# Patient Record
Sex: FEMALE | Race: WHITE | NOT HISPANIC OR LATINO | URBAN - METROPOLITAN AREA
[De-identification: names, ages, dates, MRNs, and addresses within clinical notes are randomized per-mention and may not be internally consistent; named-entity substitution may affect disease eponyms.]

---

## 2017-05-05 ENCOUNTER — OUTPATIENT (OUTPATIENT)
Dept: OUTPATIENT SERVICES | Facility: HOSPITAL | Age: 62
LOS: 1 days | End: 2017-05-05
Payer: COMMERCIAL

## 2017-05-05 VITALS
SYSTOLIC BLOOD PRESSURE: 148 MMHG | DIASTOLIC BLOOD PRESSURE: 84 MMHG | HEIGHT: 63 IN | WEIGHT: 154.98 LBS | TEMPERATURE: 99 F | RESPIRATION RATE: 18 BRPM | OXYGEN SATURATION: 98 % | HEART RATE: 78 BPM

## 2017-05-05 DIAGNOSIS — Z01.818 ENCOUNTER FOR OTHER PREPROCEDURAL EXAMINATION: ICD-10-CM

## 2017-05-05 DIAGNOSIS — M75.22 BICIPITAL TENDINITIS, LEFT SHOULDER: ICD-10-CM

## 2017-05-05 DIAGNOSIS — Z98.890 OTHER SPECIFIED POSTPROCEDURAL STATES: Chronic | ICD-10-CM

## 2017-05-05 PROCEDURE — G0463: CPT

## 2017-05-05 NOTE — H&P PST ADULT - PROBLEM SELECTOR PLAN 1
Scheduled for left shoulder arthroscopy, rotator cuff repair, possible subscapular repair, subacromial decompression, possible biceps tenodesis and poasis tenotomy on 5/16/17

## 2017-05-05 NOTE — H&P PST ADULT - FAMILY HISTORY
Mother  Still living? No  Family history of stroke, Age at diagnosis: Age Unknown     Father  Still living? No  Family history of lung cancer, Age at diagnosis: Age Unknown

## 2017-05-05 NOTE — H&P PST ADULT - NSANTHOSAYNRD_GEN_A_CORE
No. CHANTALE screening performed.  STOP BANG Legend: 0-2 = LOW Risk; 3-4 = INTERMEDIATE Risk; 5-8 = HIGH Risk

## 2017-05-05 NOTE — H&P PST ADULT - HISTORY OF PRESENT ILLNESS
61year old female with pmhx of hypertension, umbilical hernia and osteoarthritis presents today with left shoulder pain x years. Patient is here today for presurgical testing for scheduled left shoulder arthroscopy, rotator cuff repair, possible subscapular repair, subacromial decompression, possible biceps tenodesis and poasis tenotomy on 5/16/17

## 2017-05-15 RX ORDER — ACETAMINOPHEN 500 MG
975 TABLET ORAL ONCE
Qty: 0 | Refills: 0 | Status: COMPLETED | OUTPATIENT
Start: 2017-05-16 | End: 2017-05-16

## 2017-05-15 RX ORDER — CELECOXIB 200 MG/1
200 CAPSULE ORAL ONCE
Qty: 0 | Refills: 0 | Status: COMPLETED | OUTPATIENT
Start: 2017-05-16 | End: 2017-05-16

## 2017-05-16 ENCOUNTER — OUTPATIENT (OUTPATIENT)
Dept: OUTPATIENT SERVICES | Facility: HOSPITAL | Age: 62
LOS: 1 days | Discharge: HOME | End: 2017-05-16
Payer: COMMERCIAL

## 2017-05-16 VITALS
WEIGHT: 154.98 LBS | TEMPERATURE: 98 F | RESPIRATION RATE: 14 BRPM | HEART RATE: 77 BPM | OXYGEN SATURATION: 98 % | DIASTOLIC BLOOD PRESSURE: 83 MMHG | SYSTOLIC BLOOD PRESSURE: 138 MMHG | HEIGHT: 63 IN

## 2017-05-16 VITALS
HEART RATE: 76 BPM | DIASTOLIC BLOOD PRESSURE: 74 MMHG | OXYGEN SATURATION: 98 % | SYSTOLIC BLOOD PRESSURE: 130 MMHG | TEMPERATURE: 98 F | RESPIRATION RATE: 12 BRPM

## 2017-05-16 DIAGNOSIS — M75.22 BICIPITAL TENDINITIS, LEFT SHOULDER: ICD-10-CM

## 2017-05-16 DIAGNOSIS — Z98.890 OTHER SPECIFIED POSTPROCEDURAL STATES: Chronic | ICD-10-CM

## 2017-05-16 PROCEDURE — C1889: CPT

## 2017-05-16 PROCEDURE — 23430 REPAIR BICEPS TENDON: CPT | Mod: LT

## 2017-05-16 PROCEDURE — 73030 X-RAY EXAM OF SHOULDER: CPT | Mod: 26,50

## 2017-05-16 PROCEDURE — 29827 SHO ARTHRS SRG RT8TR CUF RPR: CPT | Mod: LT

## 2017-05-16 PROCEDURE — 73030 X-RAY EXAM OF SHOULDER: CPT

## 2017-05-16 PROCEDURE — C1713: CPT

## 2017-05-16 RX ORDER — SODIUM CHLORIDE 9 MG/ML
1000 INJECTION, SOLUTION INTRAVENOUS
Qty: 0 | Refills: 0 | Status: DISCONTINUED | OUTPATIENT
Start: 2017-05-16 | End: 2017-05-16

## 2017-05-16 RX ORDER — DICLOFENAC SODIUM 75 MG/1
1 TABLET, DELAYED RELEASE ORAL
Qty: 0 | Refills: 0 | COMMUNITY

## 2017-05-16 RX ORDER — ACETAMINOPHEN 500 MG
1000 TABLET ORAL ONCE
Qty: 0 | Refills: 0 | Status: COMPLETED | OUTPATIENT
Start: 2017-05-16 | End: 2017-05-16

## 2017-05-16 RX ORDER — HYDROMORPHONE HYDROCHLORIDE 2 MG/ML
0.5 INJECTION INTRAMUSCULAR; INTRAVENOUS; SUBCUTANEOUS
Qty: 0 | Refills: 0 | Status: DISCONTINUED | OUTPATIENT
Start: 2017-05-16 | End: 2017-05-16

## 2017-05-16 RX ORDER — ONDANSETRON 8 MG/1
4 TABLET, FILM COATED ORAL ONCE
Qty: 0 | Refills: 0 | Status: DISCONTINUED | OUTPATIENT
Start: 2017-05-16 | End: 2017-05-16

## 2017-05-16 RX ORDER — SODIUM CHLORIDE 9 MG/ML
3 INJECTION INTRAMUSCULAR; INTRAVENOUS; SUBCUTANEOUS EVERY 8 HOURS
Qty: 0 | Refills: 0 | Status: DISCONTINUED | OUTPATIENT
Start: 2017-05-16 | End: 2017-05-16

## 2017-05-16 RX ORDER — ONDANSETRON 8 MG/1
4 TABLET, FILM COATED ORAL EVERY 6 HOURS
Qty: 0 | Refills: 0 | Status: DISCONTINUED | OUTPATIENT
Start: 2017-05-16 | End: 2017-05-24

## 2017-05-16 RX ORDER — OXYCODONE HYDROCHLORIDE 5 MG/1
1 TABLET ORAL
Qty: 0 | Refills: 0 | COMMUNITY
Start: 2017-05-16

## 2017-05-16 RX ORDER — SODIUM CHLORIDE 9 MG/ML
1000 INJECTION, SOLUTION INTRAVENOUS
Qty: 0 | Refills: 0 | Status: DISCONTINUED | OUTPATIENT
Start: 2017-05-16 | End: 2017-05-24

## 2017-05-16 RX ADMIN — HYDROMORPHONE HYDROCHLORIDE 0.5 MILLIGRAM(S): 2 INJECTION INTRAMUSCULAR; INTRAVENOUS; SUBCUTANEOUS at 13:27

## 2017-05-16 RX ADMIN — HYDROMORPHONE HYDROCHLORIDE 0.5 MILLIGRAM(S): 2 INJECTION INTRAMUSCULAR; INTRAVENOUS; SUBCUTANEOUS at 13:12

## 2017-05-16 RX ADMIN — Medication 975 MILLIGRAM(S): at 07:25

## 2017-05-16 RX ADMIN — HYDROMORPHONE HYDROCHLORIDE 0.5 MILLIGRAM(S): 2 INJECTION INTRAMUSCULAR; INTRAVENOUS; SUBCUTANEOUS at 15:16

## 2017-05-16 RX ADMIN — HYDROMORPHONE HYDROCHLORIDE 0.5 MILLIGRAM(S): 2 INJECTION INTRAMUSCULAR; INTRAVENOUS; SUBCUTANEOUS at 14:12

## 2017-05-16 RX ADMIN — Medication 1000 MILLIGRAM(S): at 14:27

## 2017-05-16 RX ADMIN — SODIUM CHLORIDE 3 MILLILITER(S): 9 INJECTION INTRAMUSCULAR; INTRAVENOUS; SUBCUTANEOUS at 07:32

## 2017-05-16 RX ADMIN — SODIUM CHLORIDE 125 MILLILITER(S): 9 INJECTION, SOLUTION INTRAVENOUS at 14:00

## 2017-05-16 RX ADMIN — HYDROMORPHONE HYDROCHLORIDE 0.5 MILLIGRAM(S): 2 INJECTION INTRAMUSCULAR; INTRAVENOUS; SUBCUTANEOUS at 14:29

## 2017-05-16 RX ADMIN — Medication 400 MILLIGRAM(S): at 13:57

## 2017-05-16 RX ADMIN — CELECOXIB 200 MILLIGRAM(S): 200 CAPSULE ORAL at 07:25

## 2017-05-16 RX ADMIN — HYDROMORPHONE HYDROCHLORIDE 0.5 MILLIGRAM(S): 2 INJECTION INTRAMUSCULAR; INTRAVENOUS; SUBCUTANEOUS at 13:01

## 2017-05-16 RX ADMIN — HYDROMORPHONE HYDROCHLORIDE 0.5 MILLIGRAM(S): 2 INJECTION INTRAMUSCULAR; INTRAVENOUS; SUBCUTANEOUS at 14:27

## 2017-05-16 NOTE — ASU DISCHARGE PLAN (ADULT/PEDIATRIC). - MEDICATION SUMMARY - MEDICATIONS TO TAKE
I will START or STAY ON the medications listed below when I get home from the hospital:    acetaminophen-oxycodone 325 mg-5 mg oral tablet  -- 1-2 tab(s) by mouth every 4-6 hours, As needed, for pain  -- patient has medication at home,  Do not need a prescription now  -- Indication: For pain    hydroCHLOROthiazide 12.5 mg oral tablet  -- 1 tab(s) by mouth once a day  -- Indication: For hypertension

## 2017-05-16 NOTE — ASU DISCHARGE PLAN (ADULT/PEDIATRIC). - MEDICATION SUMMARY - MEDICATIONS TO STOP TAKING
I will STOP taking the medications listed below when I get home from the hospital:    diclofenac potassium 50 mg oral tablet  -- 1 tab(s) by mouth 3 times a day

## 2017-05-17 ENCOUNTER — TRANSCRIPTION ENCOUNTER (OUTPATIENT)
Age: 62
End: 2017-05-17

## 2017-05-19 DIAGNOSIS — F17.210 NICOTINE DEPENDENCE, CIGARETTES, UNCOMPLICATED: ICD-10-CM

## 2017-05-19 DIAGNOSIS — S46.812A STRAIN OF OTHER MUSCLES, FASCIA AND TENDONS AT SHOULDER AND UPPER ARM LEVEL, LEFT ARM, INITIAL ENCOUNTER: ICD-10-CM

## 2017-05-19 DIAGNOSIS — S46.012A STRAIN OF MUSCLE(S) AND TENDON(S) OF THE ROTATOR CUFF OF LEFT SHOULDER, INITIAL ENCOUNTER: ICD-10-CM

## 2017-05-19 DIAGNOSIS — I10 ESSENTIAL (PRIMARY) HYPERTENSION: ICD-10-CM
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
## 2017-05-19 DIAGNOSIS — M19.90 UNSPECIFIED OSTEOARTHRITIS, UNSPECIFIED SITE: ICD-10-CM
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
## 2017-10-20 ENCOUNTER — APPOINTMENT (OUTPATIENT)
Dept: UROGYNECOLOGY | Facility: CLINIC | Age: 62
End: 2017-10-20
Payer: COMMERCIAL

## 2017-10-20 VITALS
WEIGHT: 158 LBS | SYSTOLIC BLOOD PRESSURE: 158 MMHG | HEIGHT: 63 IN | HEART RATE: 68 BPM | BODY MASS INDEX: 28 KG/M2 | DIASTOLIC BLOOD PRESSURE: 80 MMHG

## 2017-10-20 DIAGNOSIS — Z83.49 FAMILY HISTORY OF OTHER ENDOCRINE, NUTRITIONAL AND METABOLIC DISEASES: ICD-10-CM

## 2017-10-20 DIAGNOSIS — Z83.3 FAMILY HISTORY OF DIABETES MELLITUS: ICD-10-CM

## 2017-10-20 DIAGNOSIS — R35.0 FREQUENCY OF MICTURITION: ICD-10-CM

## 2017-10-20 DIAGNOSIS — Z82.49 FAMILY HISTORY OF ISCHEMIC HEART DISEASE AND OTHER DISEASES OF THE CIRCULATORY SYSTEM: ICD-10-CM

## 2017-10-20 DIAGNOSIS — R31.29 OTHER MICROSCOPIC HEMATURIA: ICD-10-CM

## 2017-10-20 PROCEDURE — 51701 INSERT BLADDER CATHETER: CPT

## 2017-10-20 PROCEDURE — 81003 URINALYSIS AUTO W/O SCOPE: CPT | Mod: QW

## 2017-10-20 PROCEDURE — 99204 OFFICE O/P NEW MOD 45 MIN: CPT | Mod: 25

## 2017-10-20 RX ORDER — CEPHALEXIN 500 MG/1
500 CAPSULE ORAL
Qty: 12 | Refills: 0 | Status: DISCONTINUED | COMMUNITY
Start: 2017-05-08

## 2017-10-20 RX ORDER — OXYCODONE AND ACETAMINOPHEN 5; 325 MG/1; MG/1
5-325 TABLET ORAL
Qty: 53 | Refills: 0 | Status: DISCONTINUED | COMMUNITY
Start: 2017-06-05

## 2017-10-20 RX ORDER — ACETAMINOPHEN 325 MG/1
TABLET, FILM COATED ORAL
Refills: 0 | Status: ACTIVE | COMMUNITY

## 2017-10-20 RX ORDER — HYDROCHLOROTHIAZIDE 12.5 MG/1
12.5 TABLET ORAL
Qty: 30 | Refills: 0 | Status: DISCONTINUED | COMMUNITY
Start: 2017-02-27

## 2017-10-23 LAB
APPEARANCE: CLEAR
BACTERIA UR CULT: NORMAL
BACTERIA: NEGATIVE
BILIRUB UR QL STRIP: NORMAL
BILIRUBIN URINE: NEGATIVE
BLOOD URINE: NEGATIVE
CLARITY UR: CLEAR
COLLECTION METHOD: NORMAL
COLOR: YELLOW
GLUCOSE QUALITATIVE U: NEGATIVE MG/DL
GLUCOSE UR-MCNC: NORMAL
HCG UR QL: 0.2 EU/DL
HGB UR QL STRIP.AUTO: NORMAL
HYALINE CASTS: 1 /LPF
KETONES UR-MCNC: NORMAL
KETONES URINE: NEGATIVE
LEUKOCYTE ESTERASE UR QL STRIP: NORMAL
LEUKOCYTE ESTERASE URINE: NEGATIVE
MICROSCOPIC-UA: NORMAL
NITRITE UR QL STRIP: NORMAL
NITRITE URINE: NEGATIVE
PH UR STRIP: 5.5
PH URINE: 5.5
PROT UR STRIP-MCNC: NORMAL
PROTEIN URINE: NEGATIVE MG/DL
RED BLOOD CELLS URINE: 1 /HPF
SP GR UR STRIP: 1.02
SPECIFIC GRAVITY URINE: 1.03
SQUAMOUS EPITHELIAL CELLS: 1 /HPF
UROBILINOGEN URINE: NEGATIVE MG/DL
WHITE BLOOD CELLS URINE: 0 /HPF

## 2017-11-13 ENCOUNTER — MESSAGE (OUTPATIENT)
Age: 62
End: 2017-11-13

## 2017-11-16 ENCOUNTER — MESSAGE (OUTPATIENT)
Age: 62
End: 2017-11-16

## 2018-01-12 ENCOUNTER — OUTPATIENT (OUTPATIENT)
Dept: OUTPATIENT SERVICES | Facility: HOSPITAL | Age: 63
LOS: 1 days | End: 2018-01-12
Payer: COMMERCIAL

## 2018-01-12 ENCOUNTER — APPOINTMENT (OUTPATIENT)
Dept: UROGYNECOLOGY | Facility: CLINIC | Age: 63
End: 2018-01-12
Payer: COMMERCIAL

## 2018-01-12 DIAGNOSIS — Z98.890 OTHER SPECIFIED POSTPROCEDURAL STATES: Chronic | ICD-10-CM

## 2018-01-12 PROCEDURE — 57160 INSERT PESSARY/OTHER DEVICE: CPT

## 2018-01-12 PROCEDURE — A4562: CPT

## 2018-01-17 DIAGNOSIS — N81.6 RECTOCELE: ICD-10-CM

## 2018-01-17 DIAGNOSIS — N81.2 INCOMPLETE UTEROVAGINAL PROLAPSE: ICD-10-CM

## 2018-01-17 DIAGNOSIS — N81.11 CYSTOCELE, MIDLINE: ICD-10-CM

## 2018-01-17 DIAGNOSIS — N81.9 FEMALE GENITAL PROLAPSE, UNSPECIFIED: ICD-10-CM

## 2018-01-29 ENCOUNTER — APPOINTMENT (OUTPATIENT)
Dept: UROGYNECOLOGY | Facility: CLINIC | Age: 63
End: 2018-01-29
Payer: COMMERCIAL

## 2018-01-29 PROCEDURE — A4562: CPT

## 2018-01-29 PROCEDURE — 99213 OFFICE O/P EST LOW 20 MIN: CPT

## 2018-01-29 RX ORDER — DOXYCYCLINE HYCLATE 100 MG/1
100 CAPSULE ORAL
Qty: 7 | Refills: 0 | Status: DISCONTINUED | COMMUNITY
Start: 2017-11-14

## 2018-01-29 RX ORDER — CICLOPIROX 80 MG/ML
8 SOLUTION TOPICAL
Qty: 6 | Refills: 0 | Status: DISCONTINUED | COMMUNITY
Start: 2017-12-21

## 2018-02-05 ENCOUNTER — APPOINTMENT (OUTPATIENT)
Dept: UROGYNECOLOGY | Facility: CLINIC | Age: 63
End: 2018-02-05

## 2018-02-16 ENCOUNTER — APPOINTMENT (OUTPATIENT)
Dept: UROGYNECOLOGY | Facility: CLINIC | Age: 63
End: 2018-02-16

## 2018-05-24 ENCOUNTER — MESSAGE (OUTPATIENT)
Age: 63
End: 2018-05-24

## 2018-10-17 PROBLEM — M19.90 UNSPECIFIED OSTEOARTHRITIS, UNSPECIFIED SITE: Chronic | Status: ACTIVE | Noted: 2017-05-05

## 2018-10-17 PROBLEM — K42.9 UMBILICAL HERNIA WITHOUT OBSTRUCTION OR GANGRENE: Chronic | Status: ACTIVE | Noted: 2017-05-05

## 2018-11-07 ENCOUNTER — APPOINTMENT (OUTPATIENT)
Dept: UROGYNECOLOGY | Facility: CLINIC | Age: 63
End: 2018-11-07
Payer: COMMERCIAL

## 2018-11-07 DIAGNOSIS — N39.41 URGE INCONTINENCE: ICD-10-CM

## 2018-11-07 DIAGNOSIS — N95.0 POSTMENOPAUSAL BLEEDING: ICD-10-CM

## 2018-11-07 LAB
BILIRUB UR QL STRIP: NORMAL
CLARITY UR: CLEAR
COLLECTION METHOD: NORMAL
GLUCOSE UR-MCNC: NORMAL
HCG UR QL: 0.2 EU/DL
HGB UR QL STRIP.AUTO: NORMAL
KETONES UR-MCNC: NORMAL
LEUKOCYTE ESTERASE UR QL STRIP: NORMAL
NITRITE UR QL STRIP: NORMAL
PH UR STRIP: 5.5
PROT UR STRIP-MCNC: NORMAL
SP GR UR STRIP: 1.02

## 2018-11-07 PROCEDURE — 99215 OFFICE O/P EST HI 40 MIN: CPT | Mod: 25

## 2018-11-07 PROCEDURE — 51701 INSERT BLADDER CATHETER: CPT

## 2018-11-07 PROCEDURE — 81003 URINALYSIS AUTO W/O SCOPE: CPT | Mod: QW

## 2018-11-08 ENCOUNTER — RESULT REVIEW (OUTPATIENT)
Age: 63
End: 2018-11-08

## 2018-11-08 LAB
APPEARANCE: CLEAR
BACTERIA: NEGATIVE
BILIRUBIN URINE: NEGATIVE
BLOOD URINE: NEGATIVE
COLOR: YELLOW
GLUCOSE QUALITATIVE U: NEGATIVE MG/DL
KETONES URINE: NEGATIVE
LEUKOCYTE ESTERASE URINE: NEGATIVE
MICROSCOPIC-UA: NORMAL
NITRITE URINE: NEGATIVE
PH URINE: 5.5
PROTEIN URINE: NEGATIVE MG/DL
RED BLOOD CELLS URINE: 1 /HPF
SPECIFIC GRAVITY URINE: 1.02
SQUAMOUS EPITHELIAL CELLS: 1 /HPF
UROBILINOGEN URINE: NEGATIVE MG/DL
WHITE BLOOD CELLS URINE: 0 /HPF

## 2018-11-09 ENCOUNTER — RESULT REVIEW (OUTPATIENT)
Age: 63
End: 2018-11-09

## 2018-11-09 LAB — BACTERIA UR CULT: NORMAL

## 2018-11-18 ENCOUNTER — FORM ENCOUNTER (OUTPATIENT)
Age: 63
End: 2018-11-18

## 2018-11-19 ENCOUNTER — APPOINTMENT (OUTPATIENT)
Dept: ULTRASOUND IMAGING | Facility: CLINIC | Age: 63
End: 2018-11-19
Payer: COMMERCIAL

## 2018-11-19 ENCOUNTER — OUTPATIENT (OUTPATIENT)
Dept: OUTPATIENT SERVICES | Facility: HOSPITAL | Age: 63
LOS: 1 days | End: 2018-11-19
Payer: COMMERCIAL

## 2018-11-19 DIAGNOSIS — Z00.8 ENCOUNTER FOR OTHER GENERAL EXAMINATION: ICD-10-CM

## 2018-11-19 DIAGNOSIS — Z98.890 OTHER SPECIFIED POSTPROCEDURAL STATES: Chronic | ICD-10-CM

## 2018-11-19 DIAGNOSIS — N95.0 POSTMENOPAUSAL BLEEDING: ICD-10-CM

## 2018-11-19 PROCEDURE — 76830 TRANSVAGINAL US NON-OB: CPT

## 2018-11-19 PROCEDURE — 76830 TRANSVAGINAL US NON-OB: CPT | Mod: 26

## 2018-12-05 ENCOUNTER — APPOINTMENT (OUTPATIENT)
Dept: UROGYNECOLOGY | Facility: CLINIC | Age: 63
End: 2018-12-05
Payer: COMMERCIAL

## 2018-12-05 ENCOUNTER — OUTPATIENT (OUTPATIENT)
Dept: OUTPATIENT SERVICES | Facility: HOSPITAL | Age: 63
LOS: 1 days | End: 2018-12-05

## 2018-12-05 DIAGNOSIS — Z98.890 OTHER SPECIFIED POSTPROCEDURAL STATES: Chronic | ICD-10-CM

## 2018-12-05 PROCEDURE — 51729 CYSTOMETROGRAM W/VP&UP: CPT | Mod: 26

## 2018-12-05 PROCEDURE — 51797 INTRAABDOMINAL PRESSURE TEST: CPT | Mod: 26

## 2018-12-05 PROCEDURE — 51784 ANAL/URINARY MUSCLE STUDY: CPT | Mod: 26

## 2018-12-12 ENCOUNTER — APPOINTMENT (OUTPATIENT)
Dept: UROGYNECOLOGY | Facility: CLINIC | Age: 63
End: 2018-12-12
Payer: COMMERCIAL

## 2018-12-12 DIAGNOSIS — N81.9 FEMALE GENITAL PROLAPSE, UNSPECIFIED: ICD-10-CM

## 2018-12-12 PROCEDURE — 99214 OFFICE O/P EST MOD 30 MIN: CPT

## 2019-01-22 ENCOUNTER — RESULT REVIEW (OUTPATIENT)
Age: 64
End: 2019-01-22

## 2019-01-23 ENCOUNTER — APPOINTMENT (OUTPATIENT)
Dept: UROGYNECOLOGY | Facility: CLINIC | Age: 64
End: 2019-01-23
Payer: COMMERCIAL

## 2019-01-23 VITALS — SYSTOLIC BLOOD PRESSURE: 150 MMHG | DIASTOLIC BLOOD PRESSURE: 78 MMHG | HEART RATE: 70 BPM

## 2019-01-23 DIAGNOSIS — N36.41 HYPERMOBILITY OF URETHRA: ICD-10-CM

## 2019-01-23 DIAGNOSIS — N81.6 RECTOCELE: ICD-10-CM

## 2019-01-23 PROCEDURE — 51701 INSERT BLADDER CATHETER: CPT

## 2019-01-23 PROCEDURE — 81003 URINALYSIS AUTO W/O SCOPE: CPT | Mod: QW

## 2019-01-23 PROCEDURE — 99214 OFFICE O/P EST MOD 30 MIN: CPT | Mod: 25

## 2019-01-23 NOTE — DISCUSSION/SUMMARY
[FreeTextEntry1] : I reviewed the above findings with the patient as well surgical and nonsurgical treatment options for the prolapse  and stress incontinence and she wishes to proceed with surgical correction via laparoscopic/robotic hysterectomy and sacral colpopexy for the prolapse and mid urethral sling for the stress incontinence. Risks and benefits of a supracervical hysterectomy versus total were discussed and she wishes to proceed with a supracervical hysterectomy. Risks and benefits of a BSO were discussed as well as a bilateral salpingectomy. We extensively discussed that the purpose of the fallopian tubes is for fertility and that by performing an opportunistic salpingectomy we could decrease the risk of epithelial ovarian cancer. She understands and desires neither a BSO nor a BS. We discussed the possibility of a laparotomy at the time of surgical correction. We discussed the placement of a suburethral sling. Risks and benefits of a TOT sling versus a Retropubic sling vs. mini-sling were discussed and included but not limited to bladder and bowel perforation, voiding dysfunction, and groin pain. She wishes to proceed with a Solyx mini-sling. Risks and benefits of the procedure were discussed and included but not limited to bleeding, infection, failure, erosion, dyspareunia, damage to other organs, developing chronic pelvic pain, and urinary retention. We discussed the possibility of going home with a catheter. Hospital stay, postoperative restrictions, and anesthesia were discussed. All questions were answered and she wishes to proceed with surgical correction. We discussed the risks and benefits of using [place] scientific mesh as well as the 60 minutes report regarding the resin.  [employer] response to patients was given to her.  She wishes to proceed with the use of the BSC mesh in the surgery. Consent was signed in the office.\par

## 2019-01-23 NOTE — HISTORY OF PRESENT ILLNESS
[FreeTextEntry1] : Patient returns today for follow-up on her pelvic organ prolapse and urinary incontinence. Review of systems was unchanged from previous visits. She had a pelvic exam on December 12 which revealed stage III pelvic organ prolapse with uterovaginal prolapse cystocele and rectocele. She underwent urodynamic testing also in December which revealed stress urinary incontinence. I reviewed these findings with her. She had a pelvic ultrasound in November which revealed a uterus 5.3 x 1.8 x 3.4 with an endometrial lining of 2 mm. She continues to desire surgical correction at this time.

## 2019-01-23 NOTE — PHYSICAL EXAM
[No Acute Distress] : in no acute distress [Well developed] : well developed [Well Nourished] : ~L well nourished [Oriented x3] : oriented to person, place, and time [Soft, Nontender] : the abdomen was soft and nontender [Warm and Dry] : was warm and dry to touch [Normal Appearance] : general appearance was normal [Atrophy] : atrophy [Rectocele] : a rectocele [Cystocele] : a cystocele [Uterine Prolapse] : uterine prolapse [Aa ____] : Aa [unfilled] [Ba ____] : Ba [unfilled] [C ____] : C [unfilled] [GH ____] : GH [unfilled] [TVL ____] : TVL  [unfilled] [Ap ____] : Ap [unfilled] [Bp ____] : Bp [unfilled] [D ____] : D [unfilled] [Normal] : normal [Uterine Adnexae] : were not tender and not enlarged

## 2019-01-25 ENCOUNTER — RESULT REVIEW (OUTPATIENT)
Age: 64
End: 2019-01-25

## 2019-02-01 ENCOUNTER — OUTPATIENT (OUTPATIENT)
Dept: OUTPATIENT SERVICES | Facility: HOSPITAL | Age: 64
LOS: 1 days | End: 2019-02-01
Payer: COMMERCIAL

## 2019-02-01 VITALS
HEART RATE: 68 BPM | TEMPERATURE: 98 F | SYSTOLIC BLOOD PRESSURE: 148 MMHG | RESPIRATION RATE: 15 BRPM | WEIGHT: 160.06 LBS | OXYGEN SATURATION: 98 % | HEIGHT: 62 IN | DIASTOLIC BLOOD PRESSURE: 82 MMHG

## 2019-02-01 DIAGNOSIS — N81.2 INCOMPLETE UTEROVAGINAL PROLAPSE: ICD-10-CM

## 2019-02-01 DIAGNOSIS — Z98.890 OTHER SPECIFIED POSTPROCEDURAL STATES: Chronic | ICD-10-CM

## 2019-02-01 DIAGNOSIS — N81.4 UTEROVAGINAL PROLAPSE, UNSPECIFIED: ICD-10-CM

## 2019-02-01 DIAGNOSIS — Z01.818 ENCOUNTER FOR OTHER PREPROCEDURAL EXAMINATION: ICD-10-CM

## 2019-02-01 DIAGNOSIS — Z29.9 ENCOUNTER FOR PROPHYLACTIC MEASURES, UNSPECIFIED: ICD-10-CM

## 2019-02-01 DIAGNOSIS — N39.3 STRESS INCONTINENCE (FEMALE) (MALE): ICD-10-CM

## 2019-02-01 DIAGNOSIS — N81.11 CYSTOCELE, MIDLINE: ICD-10-CM

## 2019-02-01 LAB
ANION GAP SERPL CALC-SCNC: 11 MMOL/L — SIGNIFICANT CHANGE UP (ref 5–17)
BLD GP AB SCN SERPL QL: NEGATIVE — SIGNIFICANT CHANGE UP
BUN SERPL-MCNC: 23 MG/DL — SIGNIFICANT CHANGE UP (ref 7–23)
CALCIUM SERPL-MCNC: 9.4 MG/DL — SIGNIFICANT CHANGE UP (ref 8.4–10.5)
CHLORIDE SERPL-SCNC: 106 MMOL/L — SIGNIFICANT CHANGE UP (ref 96–108)
CO2 SERPL-SCNC: 23 MMOL/L — SIGNIFICANT CHANGE UP (ref 22–31)
CREAT SERPL-MCNC: 0.76 MG/DL — SIGNIFICANT CHANGE UP (ref 0.5–1.3)
GLUCOSE SERPL-MCNC: 112 MG/DL — HIGH (ref 70–99)
HCT VFR BLD CALC: 38.8 % — SIGNIFICANT CHANGE UP (ref 34.5–45)
HGB BLD-MCNC: 12.3 G/DL — SIGNIFICANT CHANGE UP (ref 11.5–15.5)
MCHC RBC-ENTMCNC: 26 PG — LOW (ref 27–34)
MCHC RBC-ENTMCNC: 31.7 GM/DL — LOW (ref 32–36)
MCV RBC AUTO: 82 FL — SIGNIFICANT CHANGE UP (ref 80–100)
PLATELET # BLD AUTO: 254 K/UL — SIGNIFICANT CHANGE UP (ref 150–400)
POTASSIUM SERPL-MCNC: 4.1 MMOL/L — SIGNIFICANT CHANGE UP (ref 3.5–5.3)
POTASSIUM SERPL-SCNC: 4.1 MMOL/L — SIGNIFICANT CHANGE UP (ref 3.5–5.3)
RBC # BLD: 4.73 M/UL — SIGNIFICANT CHANGE UP (ref 3.8–5.2)
RBC # FLD: 14.9 % — HIGH (ref 10.3–14.5)
RH IG SCN BLD-IMP: POSITIVE — SIGNIFICANT CHANGE UP
SODIUM SERPL-SCNC: 140 MMOL/L — SIGNIFICANT CHANGE UP (ref 135–145)
WBC # BLD: 7.38 K/UL — SIGNIFICANT CHANGE UP (ref 3.8–10.5)
WBC # FLD AUTO: 7.38 K/UL — SIGNIFICANT CHANGE UP (ref 3.8–10.5)

## 2019-02-01 PROCEDURE — G0463: CPT

## 2019-02-01 PROCEDURE — 86901 BLOOD TYPING SEROLOGIC RH(D): CPT

## 2019-02-01 PROCEDURE — 86900 BLOOD TYPING SEROLOGIC ABO: CPT

## 2019-02-01 PROCEDURE — 83036 HEMOGLOBIN GLYCOSYLATED A1C: CPT

## 2019-02-01 PROCEDURE — 85027 COMPLETE CBC AUTOMATED: CPT

## 2019-02-01 PROCEDURE — 86850 RBC ANTIBODY SCREEN: CPT

## 2019-02-01 PROCEDURE — 80048 BASIC METABOLIC PNL TOTAL CA: CPT

## 2019-02-01 RX ORDER — SODIUM CHLORIDE 9 MG/ML
3 INJECTION INTRAMUSCULAR; INTRAVENOUS; SUBCUTANEOUS EVERY 8 HOURS
Qty: 0 | Refills: 0 | Status: DISCONTINUED | OUTPATIENT
Start: 2019-02-14 | End: 2019-02-14

## 2019-02-01 RX ORDER — CEFOTETAN DISODIUM 1 G
2 VIAL (EA) INJECTION ONCE
Qty: 0 | Refills: 0 | Status: DISCONTINUED | OUTPATIENT
Start: 2019-02-14 | End: 2019-02-15

## 2019-02-01 RX ORDER — LIDOCAINE HCL 20 MG/ML
0.2 VIAL (ML) INJECTION ONCE
Qty: 0 | Refills: 0 | Status: DISCONTINUED | OUTPATIENT
Start: 2019-02-14 | End: 2019-02-14

## 2019-02-01 RX ORDER — ACETAMINOPHEN 500 MG
975 TABLET ORAL ONCE
Qty: 0 | Refills: 0 | Status: DISCONTINUED | OUTPATIENT
Start: 2019-02-14 | End: 2019-02-15

## 2019-02-01 NOTE — H&P PST ADULT - ACTIVITY
Walks, ADL's, house chores, stationary bike daily 30mins ADLs, walks, house chores, climb stairs daily, slightly limited due to uterovaginal prolapse ADLs, walks, moderate housework, climb stairs daily, slightly limited due to uterovaginal prolapse

## 2019-02-01 NOTE — H&P PST ADULT - PSH
History of umbilical hernia repair H/O colonoscopy    History of umbilical hernia repair  done in [place], ~1990  S/P rotator cuff repair  5/2017, left shoulder

## 2019-02-01 NOTE — H&P PST ADULT - HISTORY OF PRESENT ILLNESS
61year old female with pmhx of hypertension, umbilical hernia and osteoarthritis presents today with left shoulder pain x years. Patient is here today for presurgical testing for scheduled left shoulder arthroscopy, rotator cuff repair, possible subscapular repair, subacromial decompression, possible biceps tenodesis and poasis tenotomy on 5/16/17 64 y/o female with PMHx of hypertension (no medication), OA, has uterovaginal prolapse, cystocele, and rectocele. s/p urodynamic testing revealed urinary stress incontinence. Pelvic US revealed thickening endometrial lining. Evaluated by Dr. Sevilla. Presents to PST for a scheduled cystoscopy, sling, laparoscopic robotic sacral colpopexy, laparoscopic robotic supracervical hysterectomy on 2/14/2019.

## 2019-02-01 NOTE — H&P PST ADULT - PMH
Hypertension    Osteoarthritis    Umbilical hernia Hypertension  not on medication  Osteoarthritis    Umbilical hernia Hypertension  not on medication  Osteoarthritis    Umbilical hernia    Uterovaginal prolapse

## 2019-02-01 NOTE — H&P PST ADULT - PROBLEM SELECTOR PLAN 1
Scheduled cystoscopy, sling, laparoscopic robotic sacral colpopexy, laparoscopic robotic supracervical hysterectomy on 2/14/2019  Pre-op instructions given to pt, labwork sent at PST

## 2019-02-01 NOTE — H&P PST ADULT - ASSESSMENT
CAPRINI SCORE [CLOT updated 18]    AGE RELATED RISK FACTORS                                                       MOBILITY RELATED FACTORS  [ ] Age 41-60 years                                            (1 Point)                    [ ] Bed rest                                                        (1 Point)  [x] Age: 61-74 years                                           (2 Points)                  [ ] Plaster cast                                                   (2 Points)  [ ] Age= 75 years                                              (3 Points)                    [ ] Bed bound for more than 72 hours                 (2 Points)    DISEASE RELATED RISK FACTORS                                               GENDER SPECIFIC FACTORS  [ ] Edema in the lower extremities                       (1 Point)              [ ] Pregnancy                                                     (1 Point)  [ ] Varicose veins                                               (1 Point)                     [ ] Post-partum < 6 weeks                                   (1 Point)             [x] BMI > 25 Kg/m2                                            (1 Point)                     [ ] Hormonal therapy  or oral contraception          (1 Point)                 [ ] Sepsis (in the previous month)                        (1 Point)               [ ] History of pregnancy complications                 (1 point)  [ ] Pneumonia or serious lung disease                                               [ ] Unexplained or recurrent                     (1 Point)           (in the previous month)                               (1 Point)  [ ] Abnormal pulmonary function test                     (1 Point)                 SURGERY RELATED RISK FACTORS  [ ] Acute myocardial infarction                              (1 Point)               [ ]  Section                                             (1 Point)  [ ] Congestive heart failure (in the previous month)  (1 Point)      [ ] Minor surgery                                                  (1 Point)   [ ] Inflammatory bowel disease                             (1 Point)               [ ] Arthroscopic surgery                                        (2 Points)  [ ] Central venous access                                      (2 Points)                [x] General surgery lasting more than 45 minutes (2 points)  [ ] Present or previous malignancy                     (2 Points)                [ ] Elective arthroplasty                                         (5 points)    [ ] Stroke (in the previous month)                          (5 Points)                                                                                                                                                           HEMATOLOGY RELATED FACTORS                                                 TRAUMA RELATED RISK FACTORS  [ ] Prior episodes of VTE                                     (3 Points)                [ ] Fracture of the hip, pelvis, or leg                       (5 Points)  [ ] Positive family history for VTE                         (3 Points)             [ ] Acute spinal cord injury (in the previous month)  (5 Points)  [ ] Prothrombin 83499 A                                     (3 Points)               [ ] Paralysis  (less than 1 month)                             (5 Points)  [ ] Factor V Leiden                                             (3 Points)                  [ ] Multiple Trauma within 1 month                        (5 Points)  [ ] Lupus anticoagulants                                     (3 Points)                                                           [ ] Anticardiolipin antibodies                               (3 Points)                                                       [ ] High homocysteine in the blood                      (3 Points)                                             [ ] Other congenital or acquired thrombophilia      (3 Points)                                                [ ] Heparin induced thrombocytopenia                  (3 Points)                                     Total Score [   5     ]

## 2019-02-01 NOTE — H&P PST ADULT - PRIMARY CARE PROVIDER
Dr. Veronica Dias, PCP - [phone] Dr. Veronica Dias ([facility]) [phone], appt 1/30 Veronica Dias ([facility]) [phone], appt 1/30

## 2019-02-02 PROBLEM — I10 ESSENTIAL (PRIMARY) HYPERTENSION: Chronic | Status: ACTIVE | Noted: 2017-05-05

## 2019-02-02 LAB — HBA1C BLD-MCNC: 5.7 % — HIGH (ref 4–5.6)

## 2019-02-13 ENCOUNTER — TRANSCRIPTION ENCOUNTER (OUTPATIENT)
Age: 64
End: 2019-02-13

## 2019-02-14 ENCOUNTER — RESULT REVIEW (OUTPATIENT)
Age: 64
End: 2019-02-14

## 2019-02-14 ENCOUNTER — TRANSCRIPTION ENCOUNTER (OUTPATIENT)
Age: 64
End: 2019-02-14

## 2019-02-14 ENCOUNTER — APPOINTMENT (OUTPATIENT)
Dept: UROGYNECOLOGY | Facility: HOSPITAL | Age: 64
End: 2019-02-14
Payer: COMMERCIAL

## 2019-02-14 ENCOUNTER — INPATIENT (INPATIENT)
Facility: HOSPITAL | Age: 64
LOS: 0 days | Discharge: HOME | DRG: 743 | End: 2019-02-15
Attending: UROLOGY | Admitting: UROLOGY
Payer: COMMERCIAL

## 2019-02-14 VITALS
RESPIRATION RATE: 16 BRPM | SYSTOLIC BLOOD PRESSURE: 137 MMHG | HEIGHT: 62 IN | TEMPERATURE: 98 F | DIASTOLIC BLOOD PRESSURE: 84 MMHG | WEIGHT: 160.06 LBS | OXYGEN SATURATION: 97 % | HEART RATE: 68 BPM

## 2019-02-14 DIAGNOSIS — Z98.890 OTHER SPECIFIED POSTPROCEDURAL STATES: Chronic | ICD-10-CM

## 2019-02-14 DIAGNOSIS — N81.2 INCOMPLETE UTEROVAGINAL PROLAPSE: ICD-10-CM

## 2019-02-14 DIAGNOSIS — N81.11 CYSTOCELE, MIDLINE: ICD-10-CM

## 2019-02-14 LAB
GLUCOSE BLDC GLUCOMTR-MCNC: 113 MG/DL — HIGH (ref 70–99)
RH IG SCN BLD-IMP: POSITIVE — SIGNIFICANT CHANGE UP

## 2019-02-14 PROCEDURE — 88305 TISSUE EXAM BY PATHOLOGIST: CPT | Mod: 26

## 2019-02-14 PROCEDURE — 57288 REPAIR BLADDER DEFECT: CPT

## 2019-02-14 PROCEDURE — 57425 LAPAROSCOPY SURG COLPOPEXY: CPT

## 2019-02-14 PROCEDURE — 58542 LSH W/T/O UT 250 G OR LESS: CPT

## 2019-02-14 RX ORDER — HYDROCHLOROTHIAZIDE 25 MG
12.5 TABLET ORAL DAILY
Qty: 0 | Refills: 0 | Status: DISCONTINUED | OUTPATIENT
Start: 2019-02-15 | End: 2019-02-15

## 2019-02-14 RX ORDER — SODIUM CHLORIDE 9 MG/ML
1000 INJECTION, SOLUTION INTRAVENOUS
Qty: 0 | Refills: 0 | Status: DISCONTINUED | OUTPATIENT
Start: 2019-02-14 | End: 2019-02-14

## 2019-02-14 RX ORDER — SODIUM CHLORIDE 9 MG/ML
1000 INJECTION, SOLUTION INTRAVENOUS
Qty: 0 | Refills: 0 | Status: DISCONTINUED | OUTPATIENT
Start: 2019-02-14 | End: 2019-02-15

## 2019-02-14 RX ORDER — ACETAMINOPHEN 500 MG
975 TABLET ORAL EVERY 6 HOURS
Qty: 0 | Refills: 0 | Status: DISCONTINUED | OUTPATIENT
Start: 2019-02-14 | End: 2019-02-15

## 2019-02-14 RX ORDER — HEPARIN SODIUM 5000 [USP'U]/ML
5000 INJECTION INTRAVENOUS; SUBCUTANEOUS EVERY 12 HOURS
Qty: 0 | Refills: 0 | Status: DISCONTINUED | OUTPATIENT
Start: 2019-02-14 | End: 2019-02-14

## 2019-02-14 RX ORDER — DOCUSATE SODIUM 100 MG
100 CAPSULE ORAL THREE TIMES A DAY
Qty: 0 | Refills: 0 | Status: DISCONTINUED | OUTPATIENT
Start: 2019-02-14 | End: 2019-02-15

## 2019-02-14 RX ORDER — FAMOTIDINE 10 MG/ML
20 INJECTION INTRAVENOUS ONCE
Qty: 0 | Refills: 0 | Status: COMPLETED | OUTPATIENT
Start: 2019-02-14 | End: 2019-02-14

## 2019-02-14 RX ORDER — IBUPROFEN 200 MG
600 TABLET ORAL EVERY 6 HOURS
Qty: 0 | Refills: 0 | Status: DISCONTINUED | OUTPATIENT
Start: 2019-02-14 | End: 2019-02-15

## 2019-02-14 RX ORDER — HYDROMORPHONE HYDROCHLORIDE 2 MG/ML
0.5 INJECTION INTRAMUSCULAR; INTRAVENOUS; SUBCUTANEOUS
Qty: 0 | Refills: 0 | Status: DISCONTINUED | OUTPATIENT
Start: 2019-02-14 | End: 2019-02-14

## 2019-02-14 RX ORDER — KETOROLAC TROMETHAMINE 30 MG/ML
30 SYRINGE (ML) INJECTION ONCE
Qty: 0 | Refills: 0 | Status: DISCONTINUED | OUTPATIENT
Start: 2019-02-14 | End: 2019-02-14

## 2019-02-14 RX ORDER — ONDANSETRON 8 MG/1
4 TABLET, FILM COATED ORAL ONCE
Qty: 0 | Refills: 0 | Status: DISCONTINUED | OUTPATIENT
Start: 2019-02-14 | End: 2019-02-14

## 2019-02-14 RX ORDER — IBUPROFEN 200 MG
1 TABLET ORAL
Qty: 0 | Refills: 0 | COMMUNITY

## 2019-02-14 RX ORDER — OXYCODONE HYDROCHLORIDE 5 MG/1
1 TABLET ORAL
Qty: 8 | Refills: 0 | OUTPATIENT
Start: 2019-02-14 | End: 2019-02-15

## 2019-02-14 RX ORDER — OXYCODONE HYDROCHLORIDE 5 MG/1
5 TABLET ORAL
Qty: 0 | Refills: 0 | Status: DISCONTINUED | OUTPATIENT
Start: 2019-02-14 | End: 2019-02-15

## 2019-02-14 RX ORDER — SIMETHICONE 80 MG/1
80 TABLET, CHEWABLE ORAL ONCE
Qty: 0 | Refills: 0 | Status: COMPLETED | OUTPATIENT
Start: 2019-02-14 | End: 2019-02-14

## 2019-02-14 RX ORDER — CELECOXIB 200 MG/1
200 CAPSULE ORAL ONCE
Qty: 0 | Refills: 0 | Status: COMPLETED | OUTPATIENT
Start: 2019-02-14 | End: 2019-02-14

## 2019-02-14 RX ORDER — ONDANSETRON 8 MG/1
4 TABLET, FILM COATED ORAL EVERY 4 HOURS
Qty: 0 | Refills: 0 | Status: DISCONTINUED | OUTPATIENT
Start: 2019-02-14 | End: 2019-02-15

## 2019-02-14 RX ORDER — SIMETHICONE 80 MG/1
80 TABLET, CHEWABLE ORAL EVERY 4 HOURS
Qty: 0 | Refills: 0 | Status: DISCONTINUED | OUTPATIENT
Start: 2019-02-14 | End: 2019-02-15

## 2019-02-14 RX ADMIN — OXYCODONE HYDROCHLORIDE 5 MILLIGRAM(S): 5 TABLET ORAL at 16:00

## 2019-02-14 RX ADMIN — Medication 975 MILLIGRAM(S): at 23:12

## 2019-02-14 RX ADMIN — Medication 975 MILLIGRAM(S): at 23:57

## 2019-02-14 RX ADMIN — FAMOTIDINE 20 MILLIGRAM(S): 10 INJECTION INTRAVENOUS at 06:11

## 2019-02-14 RX ADMIN — HYDROMORPHONE HYDROCHLORIDE 0.5 MILLIGRAM(S): 2 INJECTION INTRAMUSCULAR; INTRAVENOUS; SUBCUTANEOUS at 12:06

## 2019-02-14 RX ADMIN — HYDROMORPHONE HYDROCHLORIDE 0.5 MILLIGRAM(S): 2 INJECTION INTRAMUSCULAR; INTRAVENOUS; SUBCUTANEOUS at 12:36

## 2019-02-14 RX ADMIN — SODIUM CHLORIDE 75 MILLILITER(S): 9 INJECTION, SOLUTION INTRAVENOUS at 13:06

## 2019-02-14 RX ADMIN — SIMETHICONE 80 MILLIGRAM(S): 80 TABLET, CHEWABLE ORAL at 20:23

## 2019-02-14 RX ADMIN — Medication 975 MILLIGRAM(S): at 18:20

## 2019-02-14 RX ADMIN — CELECOXIB 200 MILLIGRAM(S): 200 CAPSULE ORAL at 06:11

## 2019-02-14 RX ADMIN — Medication 30 MILLIGRAM(S): at 12:18

## 2019-02-14 RX ADMIN — SIMETHICONE 80 MILLIGRAM(S): 80 TABLET, CHEWABLE ORAL at 14:51

## 2019-02-14 RX ADMIN — Medication 975 MILLIGRAM(S): at 17:22

## 2019-02-14 RX ADMIN — OXYCODONE HYDROCHLORIDE 5 MILLIGRAM(S): 5 TABLET ORAL at 15:12

## 2019-02-14 RX ADMIN — Medication 30 MILLIGRAM(S): at 12:48

## 2019-02-14 NOTE — DISCHARGE NOTE ADULT - PLAN OF CARE
Recover Expect pain and spotting for the next weeks. Take ibuprofen, Tylenol and Oxycodone for pain. Use a pad as needed. Call your physician or go to the emergency room if you experience any of the following: heavy vaginal bleeding (soaking more than 2 pads in 1 hour for 2 hours), fever, chills, nausea, vomiting, or pain that is not controlled by medication. Follow-up with Dr. Sevilla in 2 weeks.

## 2019-02-14 NOTE — BRIEF OPERATIVE NOTE - POST-OP DX
KALEB (stress urinary incontinence, female)  02/14/2019    Active  Denae Rowland  Uterovaginal prolapse  02/14/2019    Active  Denae Rowland

## 2019-02-14 NOTE — DISCHARGE NOTE ADULT - INSTRUCTIONS
None Keep follow up appointment with your doctor as instructed and call if you have any signs of infection, fever, chills, difficulty urinating or moving your bowels, excessive pain, heavy vaginal bleeding or clots, scope sites reddened, warm to Keep follow up appointment with your doctor as instructed and call if you have any signs of infection, fever, chills, difficulty urinating or moving your bowels, excessive pain, heavy vaginal bleeding or clots, scope sites reddened, warm to the touch, draining,  or swollen, nausea or vomiting, any chest discomfort or any questions or concerns. No heavy lifting or streneous activity, and nothing per vagina- no sex, douching or use of tampax and no driving until cleared by doctor. Keep scope sites, clean, dry and intact and no creams or lotions to them.

## 2019-02-14 NOTE — DISCHARGE NOTE ADULT - CARE PLAN
Principal Discharge DX:	Postoperative state  Goal:	Recover  Assessment and plan of treatment:	Expect pain and spotting for the next weeks. Take ibuprofen, Tylenol and Oxycodone for pain. Use a pad as needed. Call your physician or go to the emergency room if you experience any of the following: heavy vaginal bleeding (soaking more than 2 pads in 1 hour for 2 hours), fever, chills, nausea, vomiting, or pain that is not controlled by medication. Follow-up with Dr. Sevilla in 2 weeks.

## 2019-02-14 NOTE — BRIEF OPERATIVE NOTE - PROCEDURE
<<-----Click on this checkbox to enter Procedure Lysis of adhesions  02/14/2019    Active  KGRIFFITHS  Hysterectomy, supracervical, laparoscopic, with robotic assistance if indicated  02/14/2019  Robotic  Active  KGRIFFITHS  Bilateral salpingectomy  02/14/2019    Active  KGRIFFITHS  Sacrocolpopexy using [employer] System  02/14/2019  Upsylon  Active  KGRIFFITHS  Creation of mid urethral sling in female  02/14/2019  Solyx single incision  Active  KGRIFFITHS  Cystoscopy  02/14/2019    Active  KGRIFFITHS

## 2019-02-14 NOTE — BRIEF OPERATIVE NOTE - OPERATION/FINDINGS
1) Uterovaginal prolapse  2) Omental adhesions to umbilicus/anterior abdominal wall  3) intact fascia on exam following lysis of adhesions  4) Patent ureters bilaterally and no damage to the bladder on cystoscopy following surgery   5) Normal appearing tubes and ovaries

## 2019-02-14 NOTE — CHART NOTE - NSCHARTNOTEFT_GEN_A_CORE
GYN POST-OP CHECK  RICARDO MARTINEZ    1955Allergies    No Known Allergies    Intolerances        S: Pt awake and alert resting comfortably in bed with incentive spirometer in hand.  Pt reports abdominal pain well controlled. Pt denies N/V, SOB, CP, palpitations. Tolerates clears.  Not OOB yet. No flatus yet.    O:   T(C): 36.2 (02-14-19 @ 13:00), Max: 36.2 (02-14-19 @ 13:00)  HR: 81 (02-14-19 @ 13:15) (78 - 85)  BP: 147/69 (02-14-19 @ 13:15) (136/83 - 160/78)  RR: 18 (02-14-19 @ 13:15) (18 - 18)  SpO2: 99% (02-14-19 @ 13:15) (97% - 100%)    I&O's Summary    14 Feb 2019 07:01  -  14 Feb 2019 13:39  --------------------------------------------------------  IN: 75 mL / OUT: 150 mL / NET: -75 mL      Gen: NAD. A+Ox3.  CV: S1S2, RRR  Lungs: CTA B/L  Abd: +BS. soft, gently distended and appropriately tender.  Inc: 5 robotic port sites w/ Dermabond - Clean/dry/intact  : Maen in place.  Ext: PAS in place b/l.    Operative Findings  1) Uterovaginal prolapse  2) Omental adhesions to umbilicus/anterior abdominal wall  3) intact fascia on exam following lysis of adhesions  4) Patent ureters bilaterally and no damage to the bladder on cystoscopy following surgery   5) Normal appearing tubes and ovaries      A/P: 63y Female w/ pmhx uterovaginal prolapse, OA, HTN, umbilical hernia now POD#0 s/p lsc RA EULALIA/BS, sacrocolpopexy, MUS, cysto. EBL 50cc. Doing well.    1. Neuro: Analgesia PRN. acetaminophen   Tablet .. 975 milliGRAM(s) Oral once  acetaminophen   Tablet .. 975 milliGRAM(s) Oral every 6 hours  HYDROmorphone  Injectable 0.5 milliGRAM(s) IV Push every 10 minutes PRN  ibuprofen  Tablet. 600 milliGRAM(s) Oral every 6 hours  ondansetron Injectable 4 milliGRAM(s) IV Push once PRN  ondansetron Injectable 4 milliGRAM(s) IV Push every 4 hours PRN  oxyCODONE    IR 5 milliGRAM(s) Oral every 3 hours PRN  2. Card: Monitor VS. CBC in AM.   3. Pulm: Incentive spirometer use.    4. GI: Advance to regular diet. Anti-emetics PRN.  5. : Mane to gravity. TOV in AM.  6. Electrolytes: LR@75cc/hr.  7. DVT ppx w/ PAS while in bed. Early ambulation, initially with assistance then as tolerated.   8. Discharge from PACU when criteria met.   d/w GYN team.  TIP Donnelly

## 2019-02-14 NOTE — DISCHARGE NOTE ADULT - MEDICATION SUMMARY - MEDICATIONS TO TAKE
I will START or STAY ON the medications listed below when I get home from the hospital:    oxyCODONE 5 mg oral tablet  -- 1 tab(s) by mouth every 6 hours, As Needed -Severe Pain (7 - 10) MDD:4  -- Indication: For Pain    acetaminophen 325 mg oral tablet  -- 3 tab(s) by mouth once  -- Indication: For Pain    acetaminophen 325 mg oral tablet  -- 3 tab(s) by mouth every 6 hours  -- Indication: For Pain    Advil 200 mg oral tablet  -- 1 tab(s) by mouth every 6 hours, As Needed, will stop 1 week pre-op  -- Indication: For Pain    famotidine 40 mg oral tablet  -- 1 tab(s) by mouth 2 times a day  -- Indication: For before taking advil/motrin I will START or STAY ON the medications listed below when I get home from the hospital:    oxyCODONE 5 mg oral tablet  -- 1 tab(s) by mouth every 6 hours, As Needed -Severe Pain (7 - 10) MDD:4  -- Indication: For Pain    acetaminophen 325 mg oral tablet  -- 3 tab(s) by mouth every 6 hours  -- Indication: For Pain    Advil 200 mg oral tablet  -- 1 tab(s) by mouth every 6 hours, As Needed, will stop 1 week pre-op  -- Indication: For Pain    famotidine 40 mg oral tablet  -- 1 tab(s) by mouth 2 times a day  -- Indication: For before taking advil/motrin

## 2019-02-14 NOTE — DISCHARGE NOTE ADULT - HOSPITAL COURSE
63y yo F s/p Supracervical Hysterectomy on 2/14/2019. See operative report for full details.     POD #0, pt was advanced to a regular diet, jara was discontinued and pt was able to void spontaneously.  POD#1, pt was discharged in stable condition, ambulating, tolerating po and voiding spontaneously. Pt to have close follow-up w/  [X] in clinic. 63y yo F s/p Supracervical Hysterectomy on 2/14/2019. See operative report for full details.     POD #0, pt was advanced to a regular diet,   POD#1, Patient's jara was removed and she passed trial of void. pt was discharged in stable condition, ambulating, tolerating po and voiding spontaneously. Pt to have close follow-up w/ Dr. Sevilla in clinic.

## 2019-02-14 NOTE — DISCHARGE NOTE ADULT - CARE PROVIDER_API CALL
Chema Sevilla (MD)  Female Pelvic MedReconst Surg; Obstetrics and Gynecology  [address]  [place] [address]  Phone: [phone]  Fax: [phone]  Follow Up Time:

## 2019-02-14 NOTE — DISCHARGE NOTE ADULT - REASON FOR ADMISSION
Lysis of adhesions, Hysterectomy, Supracervical, laparoscopic, with robotic assistance, Bilateral salpingectomy    Sacrocolpopexy  Creation of mid urethral sling in female  02/14/2019  Solyx single incision  Active  NAPOLEONRIALCIDES  Cystoscopy  02/14/2019

## 2019-02-15 VITALS
RESPIRATION RATE: 20 BRPM | HEART RATE: 78 BPM | SYSTOLIC BLOOD PRESSURE: 127 MMHG | OXYGEN SATURATION: 97 % | TEMPERATURE: 98 F | DIASTOLIC BLOOD PRESSURE: 72 MMHG

## 2019-02-15 DIAGNOSIS — Z98.890 OTHER SPECIFIED POSTPROCEDURAL STATES: ICD-10-CM

## 2019-02-15 LAB
BASOPHILS # BLD AUTO: 0 K/UL — SIGNIFICANT CHANGE UP (ref 0–0.2)
BASOPHILS NFR BLD AUTO: 0.1 % — SIGNIFICANT CHANGE UP (ref 0–2)
EOSINOPHIL # BLD AUTO: 0 K/UL — SIGNIFICANT CHANGE UP (ref 0–0.5)
EOSINOPHIL NFR BLD AUTO: 0.2 % — SIGNIFICANT CHANGE UP (ref 0–6)
HCT VFR BLD CALC: 37.1 % — SIGNIFICANT CHANGE UP (ref 34.5–45)
HGB BLD-MCNC: 12.5 G/DL — SIGNIFICANT CHANGE UP (ref 11.5–15.5)
LYMPHOCYTES # BLD AUTO: 21.3 % — SIGNIFICANT CHANGE UP (ref 13–44)
LYMPHOCYTES # BLD AUTO: 3 K/UL — SIGNIFICANT CHANGE UP (ref 1–3.3)
MCHC RBC-ENTMCNC: 27.5 PG — SIGNIFICANT CHANGE UP (ref 27–34)
MCHC RBC-ENTMCNC: 33.6 GM/DL — SIGNIFICANT CHANGE UP (ref 32–36)
MCV RBC AUTO: 81.7 FL — SIGNIFICANT CHANGE UP (ref 80–100)
MONOCYTES # BLD AUTO: 0.9 K/UL — SIGNIFICANT CHANGE UP (ref 0–0.9)
MONOCYTES NFR BLD AUTO: 6.4 % — SIGNIFICANT CHANGE UP (ref 2–14)
NEUTROPHILS # BLD AUTO: 10.3 K/UL — HIGH (ref 1.8–7.4)
NEUTROPHILS NFR BLD AUTO: 72 % — SIGNIFICANT CHANGE UP (ref 43–77)
PLATELET # BLD AUTO: 237 K/UL — SIGNIFICANT CHANGE UP (ref 150–400)
RBC # BLD: 4.54 M/UL — SIGNIFICANT CHANGE UP (ref 3.8–5.2)
RBC # FLD: 13.1 % — SIGNIFICANT CHANGE UP (ref 10.3–14.5)
WBC # BLD: 14.3 K/UL — HIGH (ref 3.8–10.5)
WBC # FLD AUTO: 14.3 K/UL — HIGH (ref 3.8–10.5)

## 2019-02-15 PROCEDURE — 85027 COMPLETE CBC AUTOMATED: CPT

## 2019-02-15 PROCEDURE — 88305 TISSUE EXAM BY PATHOLOGIST: CPT

## 2019-02-15 PROCEDURE — C1771: CPT

## 2019-02-15 PROCEDURE — 86900 BLOOD TYPING SEROLOGIC ABO: CPT

## 2019-02-15 PROCEDURE — C1781: CPT

## 2019-02-15 PROCEDURE — S2900: CPT

## 2019-02-15 PROCEDURE — 86901 BLOOD TYPING SEROLOGIC RH(D): CPT

## 2019-02-15 PROCEDURE — 82962 GLUCOSE BLOOD TEST: CPT

## 2019-02-15 RX ORDER — ACETAMINOPHEN 500 MG
3 TABLET ORAL
Qty: 0 | Refills: 0 | COMMUNITY
Start: 2019-02-15

## 2019-02-15 RX ORDER — FAMOTIDINE 10 MG/ML
40 INJECTION INTRAVENOUS
Qty: 0 | Refills: 0 | Status: DISCONTINUED | OUTPATIENT
Start: 2019-02-15 | End: 2019-02-15

## 2019-02-15 RX ORDER — FAMOTIDINE 10 MG/ML
1 INJECTION INTRAVENOUS
Qty: 0 | Refills: 0 | COMMUNITY
Start: 2019-02-15

## 2019-02-15 RX ADMIN — SIMETHICONE 80 MILLIGRAM(S): 80 TABLET, CHEWABLE ORAL at 05:33

## 2019-02-15 RX ADMIN — FAMOTIDINE 40 MILLIGRAM(S): 10 INJECTION INTRAVENOUS at 08:50

## 2019-02-15 RX ADMIN — Medication 975 MILLIGRAM(S): at 11:43

## 2019-02-15 RX ADMIN — Medication 975 MILLIGRAM(S): at 05:32

## 2019-02-15 RX ADMIN — Medication 975 MILLIGRAM(S): at 11:16

## 2019-02-15 RX ADMIN — OXYCODONE HYDROCHLORIDE 5 MILLIGRAM(S): 5 TABLET ORAL at 08:50

## 2019-02-15 RX ADMIN — Medication 12.5 MILLIGRAM(S): at 05:32

## 2019-02-15 RX ADMIN — OXYCODONE HYDROCHLORIDE 5 MILLIGRAM(S): 5 TABLET ORAL at 09:40

## 2019-02-15 RX ADMIN — SIMETHICONE 80 MILLIGRAM(S): 80 TABLET, CHEWABLE ORAL at 11:16

## 2019-02-15 RX ADMIN — Medication 975 MILLIGRAM(S): at 06:14

## 2019-02-15 NOTE — CHART NOTE - NSCHARTNOTEFT_GEN_A_CORE
TOV Note    Active Trial of Void performed.   280cc NS instilled into the bladder by gravity.  Mane D/C'd  Pt voided  240  cc   Mane catheter  to remain out.

## 2019-02-15 NOTE — PROGRESS NOTE ADULT - SUBJECTIVE AND OBJECTIVE BOX
R1 GYN Progress Note    POD#1   HD#2    64 y/o s/p Lsc RA EULALIA/Bilateral Salpingectomy, Sacrocolpopexy, MUS, Cysto   Patient seen and examined at bedside.  No acute events overnight. No acute complaints.  Pain moderately controlled. Patient not taking ibuprofen because of fear of stomach issues.   Patient is ambulating and tolerating PO.   Has not yet passed flatus    Mane is still in place.   Denies CP, SOB, N/V, fevers, and chills.    Vital Signs Last 24 Hours  T(C): 36.7 (02-15-19 @ 05:37), Max: 36.9 (02-14-19 @ 21:32)  HR: 74 (02-15-19 @ 05:37) (68 - 86)  BP: 135/65 (02-15-19 @ 05:37) (109/61 - 160/78)  RR: 18 (02-15-19 @ 05:37) (16 - 20)  SpO2: 98% (02-15-19 @ 05:37) (96% - 100%)    I&O's Summary    14 Feb 2019 07:01  -  15 Feb 2019 06:38  --------------------------------------------------------  IN: 765 mL / OUT: 1425 mL / NET: -660 mL        Physical Exam:  General: NAD  CV: RR, S1, S2, no M/R/G  Lungs: CTA b/l, good air flow b/l   Abdomen: Soft, appropriately-tender, normoactive bowel sounds  Incision: CDI  : no bleeding on pad  Ext: No pain or swelling     Labs:  Postop Labs Pending    MEDICATIONS  (STANDING):  acetaminophen   Tablet .. 975 milliGRAM(s) Oral once  acetaminophen   Tablet .. 975 milliGRAM(s) Oral every 6 hours  cefoTEtan  IVPB 2 Gram(s) IV Intermittent once  hydrochlorothiazide 12.5 milliGRAM(s) Oral daily  ibuprofen  Tablet. 600 milliGRAM(s) Oral every 6 hours  lactated ringers. 1000 milliLiter(s) (75 mL/Hr) IV Continuous <Continuous>    MEDICATIONS  (PRN):  docusate sodium 100 milliGRAM(s) Oral three times a day PRN Stool Softening  ondansetron Injectable 4 milliGRAM(s) IV Push every 4 hours PRN Postoperative Nausea and/or Vomiting  oxyCODONE    IR 5 milliGRAM(s) Oral every 3 hours PRN Severe Pain (7 - 10)  simethicone 80 milliGRAM(s) Chew every 4 hours PRN Gas

## 2019-02-15 NOTE — PROGRESS NOTE ADULT - ASSESSMENT
A/P: 63y POD#1 s/p Lsc RA EULALIA/Bilateral Salpingectomy, Sacrocolpopexy, MUS, Cysto.  Patient is stable and doing well. A/P: 63y POD#1 s/p Lsc RA EULALIA/Bilateral Salpingectomy, Sacrocolpopexy, MUS, Cysto.  Patient is stable and doing well.      Urogyn Fellow Addendum Progress Note:  I saw and examined the patient at bedside with  present.  No acute overnight c/o. Tolerating PO, ambulating, pain controlled  Vitals stable  Abd: soft, NT  Inc c/d/i  UOP excellent clear  Hct 37 from 38 pre-op  Will be for TOV then discharge to home  Post-operative instructions reviewed. RTO as scheduled for post-op check  All questions answered  Yajaira Burks MD

## 2019-02-15 NOTE — PROGRESS NOTE ADULT - PROBLEM SELECTOR PLAN 1
Neuro: PO pain meds. Pepcid and encourage patient to take ibuprofen on full stomach.  CV: Hemodynamically stable  Pulm: Saturating well on room air, encourage oob/amb  GI: Continue regular diet  : UOP adequate, d/c jara and TOV after breakfast  Heme: SCDs and ambulation for DVT ppx  FEN: LR@125.    ID: Afebrile  Endo: No active issues   Dispo: Continue routine post-op care    Namiat Seymour PGY1 Neuro: PO pain meds. Pepcid and encourage patient to take ibuprofen on full stomach.  CV: Hemodynamically stable, HCTZ 12.5 home BP med  Pulm: Saturating well on room air, encourage oob/amb  GI: Continue regular diet  : UOP adequate, d/c jara and TOV after breakfast  Heme: SCDs and ambulation for DVT ppx  FEN: LR@125.    ID: Afebrile  Endo: No active issues   Dispo: Continue routine post-op care    Namita Seymour PGY1

## 2019-02-20 LAB — SURGICAL PATHOLOGY STUDY: SIGNIFICANT CHANGE UP

## 2019-02-27 ENCOUNTER — MESSAGE (OUTPATIENT)
Age: 64
End: 2019-02-27

## 2019-03-06 ENCOUNTER — APPOINTMENT (OUTPATIENT)
Dept: UROGYNECOLOGY | Facility: CLINIC | Age: 64
End: 2019-03-06
Payer: COMMERCIAL

## 2019-03-06 PROBLEM — N81.4 UTEROVAGINAL PROLAPSE, UNSPECIFIED: Chronic | Status: ACTIVE | Noted: 2019-02-01

## 2019-03-06 PROCEDURE — 99024 POSTOP FOLLOW-UP VISIT: CPT

## 2019-03-06 NOTE — DISCUSSION/SUMMARY
[Post-Op instructions given. Pt/family verbalizes understanding] : post-operative instructions were provided to the patient/family who verbalize understanding [Risks/Benefits discussed. Pt/family verbalizes understanding] : risks and benefits of the procedure were discussed with the patient/family who verbalize understanding [FreeTextEntry1] : -Doing well post-operatively\par -Discussed benign pathology results\par -No evidence of retention with normal PVR on bladder scan\par -Counseled to continue post-op restrictions and avoid constipation with miralax and colace as needed\par -Continue follow up with PCP for reaction of unknown etiology \par -Return in 4 weeks for post-op exam or sooner if needed

## 2019-03-06 NOTE — OBJECTIVE
[Post Void Residual ____ ml] : Post Void Residual was [unfilled] ml [Soft and Nontender] : soft and nontender [Clean, Dry, Intact] : Clean, Dry, Intact [Good Support] : Good support [Healing well] : healing well [No Masses or Tenderness] : no masses or tenderness [FreeTextEntry1] : no rash visualized  [FreeTextEntry3] : no edema or irritation noted

## 2019-03-06 NOTE — SUBJECTIVE
[FreeTextEntry1] : NAD [FreeTextEntry8] : none currently  [FreeTextEntry7] : denies [FreeTextEntry6] : normal  [FreeTextEntry5] : denies retention or leakage of urine [FreeTextEntry4] : normal daily soft BMs, no straining [FreeTextEntry3] : normal [FreeTextEntry2] : normal  [FreeTextEntry9] : reports macular papular rash that occurred 10 days after surgery. She did not take oxycodone or any medications upon d/c once home. She only took Advil, which she takes twice daily. No new detergents or foods. Reports she is being followed by her PCP for this reaction and has plans to see an allergist.

## 2019-04-24 ENCOUNTER — APPOINTMENT (OUTPATIENT)
Dept: UROGYNECOLOGY | Facility: CLINIC | Age: 64
End: 2019-04-24
Payer: COMMERCIAL

## 2019-04-24 DIAGNOSIS — N39.3 STRESS INCONTINENCE (FEMALE) (MALE): ICD-10-CM

## 2019-04-24 DIAGNOSIS — Z98.890 OTHER SPECIFIED POSTPROCEDURAL STATES: ICD-10-CM

## 2019-04-24 DIAGNOSIS — N81.2 INCOMPLETE UTEROVAGINAL PROLAPSE: ICD-10-CM

## 2019-04-24 DIAGNOSIS — R10.2 PELVIC AND PERINEAL PAIN: ICD-10-CM

## 2019-04-24 DIAGNOSIS — N81.11 CYSTOCELE, MIDLINE: ICD-10-CM

## 2019-04-24 PROCEDURE — 99024 POSTOP FOLLOW-UP VISIT: CPT

## 2019-04-24 NOTE — DISCUSSION/SUMMARY
[Post-Op instructions given. Pt/family verbalizes understanding] : post-operative instructions were provided to the patient/family who verbalize understanding [Risks/Benefits discussed. Pt/family verbalizes understanding] : risks and benefits of the procedure were discussed with the patient/family who verbalize understanding [FreeTextEntry1] : PT happy with surgery results.  Follow up on as-needed basis.  IF pt have any problems/concern to call office.  PT agrees.

## 2019-04-24 NOTE — SUBJECTIVE
[FreeTextEntry1] : Doing very well.  PT happy with results. [FreeTextEntry8] : none [FreeTextEntry7] : Denies any pain or discomfort. [FreeTextEntry5] : Rodneyies KALEB [FreeTextEntry6] : good [FreeTextEntry3] : gait steady [FreeTextEntry4] : doing well [FreeTextEntry2] : good

## 2019-04-24 NOTE — OBJECTIVE
[Soft and Nontender] : soft and nontender [Clean, Dry, Intact] : Clean, Dry, Intact [Good Support] : Good support [Healing well] : healing well [No Masses or Tenderness] : no masses or tenderness [FreeTextEntry3] : Dr. Sevilla seen pt.

## 2019-07-04 NOTE — H&P PST ADULT - NS MD HP INPLANTS MED DEV
None
FREE:[LAST:[Orlando],FIRST:[Mitch],PHONE:[phone]],FAX:[phone]   [phone]    -],ADDRESS:[[address] [place] [address] [address], [place] [id number]]]

## 2019-07-24 ENCOUNTER — APPOINTMENT (OUTPATIENT)
Dept: PEDIATRIC ALLERGY IMMUNOLOGY | Facility: CLINIC | Age: 64
End: 2019-07-24
Payer: COMMERCIAL

## 2019-07-24 VITALS
BODY MASS INDEX: 29.06 KG/M2 | DIASTOLIC BLOOD PRESSURE: 95 MMHG | HEIGHT: 62.99 IN | SYSTOLIC BLOOD PRESSURE: 171 MMHG | WEIGHT: 164 LBS | OXYGEN SATURATION: 99 % | HEART RATE: 73 BPM

## 2019-07-24 DIAGNOSIS — L50.8 OTHER URTICARIA: ICD-10-CM

## 2019-07-24 DIAGNOSIS — L85.3 XEROSIS CUTIS: ICD-10-CM

## 2019-07-24 DIAGNOSIS — K52.29 OTHER ALLERGIC AND DIETETIC GASTROENTERITIS AND COLITIS: ICD-10-CM

## 2019-07-24 DIAGNOSIS — Z80.1 FAMILY HISTORY OF MALIGNANT NEOPLASM OF TRACHEA, BRONCHUS AND LUNG: ICD-10-CM

## 2019-07-24 DIAGNOSIS — I10 ESSENTIAL (PRIMARY) HYPERTENSION: ICD-10-CM

## 2019-07-24 DIAGNOSIS — Z71.1 PERSON WITH FEARED HEALTH COMPLAINT IN WHOM NO DIAGNOSIS IS MADE: ICD-10-CM

## 2019-07-24 PROCEDURE — 99204 OFFICE O/P NEW MOD 45 MIN: CPT | Mod: 25

## 2019-07-24 PROCEDURE — 95004 PERQ TESTS W/ALRGNC XTRCS: CPT

## 2019-07-24 RX ORDER — IRBESARTAN 75 MG/1
75 TABLET, FILM COATED ORAL
Refills: 0 | Status: ACTIVE | COMMUNITY

## 2019-07-24 RX ORDER — CETIRIZINE HYDROCHLORIDE 10 MG/1
10 TABLET, COATED ORAL
Qty: 1 | Refills: 2 | Status: ACTIVE | COMMUNITY
Start: 2019-07-24 | End: 1900-01-01

## 2019-07-24 RX ORDER — GLY/DIMETH/PETROLAT,WHT/WATER
CREAM (GRAM) TOPICAL
Qty: 1 | Refills: 3 | Status: ACTIVE | COMMUNITY
Start: 2019-07-24

## 2019-07-25 PROBLEM — I10 ELEVATED BLOOD PRESSURE READING IN OFFICE WITH DIAGNOSIS OF HYPERTENSION: Status: ACTIVE | Noted: 2019-07-25

## 2019-07-25 PROBLEM — Z71.1: Status: ACTIVE | Noted: 2019-07-25

## 2019-07-25 NOTE — REVIEW OF SYSTEMS
[Urticaria] : urticaria [Pruritis] : pruritis [Nl] : Hematologic/Lymphatic [Fatigue] : no fatigue [Fever] : no fever [Seizure] : no seizures [Headache] : no headache [Limping] : no limping [Atopic Dermatitis] : no atopic dermatitis [Dry Skin] : no ~L dry skin [Swelling] : no swelling

## 2019-07-25 NOTE — HISTORY OF PRESENT ILLNESS
[Asthma] : asthma [Allergic Rhinitis] : allergic rhinitis [Eczematous rashes] : eczematous rashes [Venom Reactions] : venom reactions [de-identified] : RICARDO MARTINEZ is a 63 year  old female, presents for allergy evaluation. \par \par Patient reports that 2 weeks after surgery 2/2019 ( laparoscopic hysterectomy, sacrocolpopexy, lysis of adhesions, midurethral sling and cystoscopy) she started having hives. She was started on cetirizine and triamcinolone cream. Her hives have been improving. The last time she took  cetirizine was a week ago and she has been hive - free.\par \par She has been taking Avapro but stopped it because she thought it was causing hives. \par She suspected that chocolate and chocolate chip cookies were associated with episodes of hives, but reactions are inconsistent. \par \par She had a history of itchy skin since she was as a child, which never required any medication. She would just take a shower and itching went away. \par

## 2019-07-25 NOTE — CONSULT LETTER
[Dear  ___] : Dear  [unfilled], [Consult Letter:] : I had the pleasure of evaluating your patient, [unfilled]. [Please see my note below.] : Please see my note below. [Consult Closing:] : Thank you very much for allowing me to participate in the care of this patient.  If you have any questions, please do not hesitate to contact me. [Sincerely,] : Sincerely, [DrPaco  ___] : Dr. JARAMILLO [FreeTextEntry3] : Mahi Ta MD FAAALLA, KWASI\par Adult and Pediatric Allergy, Asthma and Clinical Immunology\par  of Medicine and Pediatrics at\par   [facility]\par Section Head, Adult Allergy and Immunology\par   [facility]\par   Division of Allergy, Asthma and Immunology\par   [address] [place] [address], [place] [address]\par   [place] [address]\par   Phone [phone]  Fax [phone]\par \par   [DrPaco ___] : Dr. JARAMILLO

## 2019-07-25 NOTE — DATA REVIEWED
[FreeTextEntry1] : Laboratories from 3/20/2019; 2/2019 and 1/2019   reviewed: \par Normal CBC, CMP, ESR-2, negative histamine release assay, IgE-143, Ige negative to various environmental and food allergens

## 2019-07-25 NOTE — PHYSICAL EXAM
[Alert] : alert [Healthy Appearance] : healthy appearance [No Acute Distress] : no acute distress [No Discharge] : no discharge [No Photophobia] : no photophobia [Sclera Not Icteric] : sclera not icteric [Normal Nasal Mucosa] : the nasal mucosa was normal [Normal Lips/Tongue] : the lips and tongue were normal [Normal Outer Ear/Nose] : the ears and nose were normal in appearance [No Thrush] : no thrush [No Oral Lesions or Ulcers] : no oral lesions or ulcers [Supple] : the neck was supple [Normal Rate and Effort] : normal respiratory rhythm and effort [No Crackles] : no crackles [Bilateral Audible Breath Sounds] : bilateral audible breath sounds [Normal Rate] : heart rate was normal  [Regular Rhythm] : with a regular rhythm [Soft] : abdomen soft [Not Tender] : non-tender [No HSM] : no hepato-splenomegaly [Normal Cervical Lymph Nodes] : cervical [No Rash] : no rash [No Skin Lesions] : no skin lesions [No clubbing] : no clubbing [No Edema] : no edema [No Cyanosis] : no cyanosis [Normal Mood] : mood was normal [Normal Affect] : affect was normal [Alert, Awake, Oriented as Age-Appropriate] : alert, awake, oriented as age appropriate [Suborbital Bogginess] : no suborbital bogginess (allergic shiners) [Boggy Nasal Turbinates] : no boggy and/or pale nasal turbinates [Pharyngeal erythema] : no pharyngeal erythema [Exudate] : no exudate [Posterior Pharyngeal Cobblestoning] : no posterior pharyngeal cobblestoning [Clear Rhinorrhea] : no clear rhinorrhea was seen [Wheezing] : no wheezing was heard [Eczematous Patches] : no eczematous patches [Xerosis] : no xerosis [de-identified] : BMI-29

## 2019-09-25 DIAGNOSIS — Z01.818 ENCOUNTER FOR OTHER PREPROCEDURAL EXAMINATION: ICD-10-CM

## 2019-11-21 LAB — BACTERIA UR CULT: NORMAL

## 2021-05-21 NOTE — ASU PATIENT PROFILE, ADULT - MEDICATION ADMINISTRATION INFO, PROFILE
Bed: 24  Expected date: 5/21/21  Expected time:   Means of arrival:   Comments:  Triage- abd pain, pancreatic CA pt   no concerns

## 2021-07-14 NOTE — PRE-OP CHECKLIST - AICD PRESENT
Resolving current episode for case management due to patient unable to reach. Patient has not been reached after repeated calls and letters. Letter sent to patient notifying completion of services due to unable to reach. This writer's contact information and information regarding program services included in materials sent.
no

## 2022-05-10 ENCOUNTER — APPOINTMENT (OUTPATIENT)
Dept: ORTHOPEDIC SURGERY | Facility: CLINIC | Age: 67
End: 2022-05-10
Payer: COMMERCIAL

## 2022-05-10 VITALS — BODY MASS INDEX: 29.06 KG/M2 | HEIGHT: 63 IN | WEIGHT: 164 LBS

## 2022-05-10 DIAGNOSIS — M41.9 SCOLIOSIS, UNSPECIFIED: ICD-10-CM

## 2022-05-10 PROCEDURE — 99204 OFFICE O/P NEW MOD 45 MIN: CPT

## 2022-10-14 ENCOUNTER — APPOINTMENT (OUTPATIENT)
Dept: ORTHOPEDIC SURGERY | Facility: CLINIC | Age: 67
End: 2022-10-14
Payer: COMMERCIAL

## 2022-10-14 VITALS
SYSTOLIC BLOOD PRESSURE: 179 MMHG | DIASTOLIC BLOOD PRESSURE: 82 MMHG | WEIGHT: 160 LBS | HEIGHT: 63 IN | HEART RATE: 82 BPM | OXYGEN SATURATION: 98 % | BODY MASS INDEX: 28.35 KG/M2

## 2022-10-14 DIAGNOSIS — M72.0 PALMAR FASCIAL FIBROMATOSIS [DUPUYTREN]: ICD-10-CM

## 2022-10-14 DIAGNOSIS — M65.341 TRIGGER FINGER, RIGHT RING FINGER: ICD-10-CM

## 2022-10-14 PROCEDURE — 99214 OFFICE O/P EST MOD 30 MIN: CPT | Mod: 25

## 2022-10-14 PROCEDURE — 20550 NJX 1 TENDON SHEATH/LIGAMENT: CPT | Mod: RT

## 2022-10-14 PROCEDURE — 73130 X-RAY EXAM OF HAND: CPT | Mod: RT

## 2023-03-02 DIAGNOSIS — Z00.00 ENCOUNTER FOR GENERAL ADULT MEDICAL EXAMINATION W/OUT ABNORMAL FINDINGS: ICD-10-CM

## 2023-03-06 ENCOUNTER — APPOINTMENT (OUTPATIENT)
Dept: ORTHOPEDIC SURGERY | Facility: CLINIC | Age: 68
End: 2023-03-06
Payer: MEDICARE

## 2023-03-06 VITALS — WEIGHT: 168 LBS | HEIGHT: 63 IN | BODY MASS INDEX: 29.77 KG/M2

## 2023-03-06 PROCEDURE — 73564 X-RAY EXAM KNEE 4 OR MORE: CPT | Mod: RT

## 2023-03-06 PROCEDURE — 99214 OFFICE O/P EST MOD 30 MIN: CPT

## 2023-03-06 NOTE — DISCUSSION/SUMMARY
[de-identified] : This patient is right knee osteoarthritis.  The patient is not an appropriate candidate for surgical intervention at this time. An extensive discussion was conducted on the natural history of the disease and the variety of surgical and non-surgical options available to the patient including, but not limited to non-steroidal anti-inflammatory medications, steroid injections, physical therapy, maintenance of ideal body weight, and reduction of activity. PT and celebrex rx'd. She refused injections. The patient will schedule an appointment as needed.\par

## 2023-03-06 NOTE — HISTORY OF PRESENT ILLNESS
[de-identified] : This is very nice 67-year-old female experiencing right knee pain, which is severe in intensity. The pain substantially limits activities of daily living. Walking tolerance is reduced. Has not tried NSAIDs, PT, injections. No cane. The patient denies any radiation of the pain to the feet and it is not associated with numbness, tingling, or weakness.

## 2023-03-06 NOTE — PHYSICAL EXAM
[de-identified] : Patient is well nourished, well-developed, in no acute distress, with appropriate mood and affect. The patient is oriented to time, place, and person. Respirations are even and unlabored. Gait evaluation does reveal a limp. There is no inguinal adenopathy. Examination of the contralateral knee shows normal range of motion, strength, no tenderness, and intact skin. The affected limb is well-perfused, without skin lesions, shows a grossly normal motor and sensory examination. Knee motion is significantly reduced and does cause significant pain. The knee moves from 0 to 125 degrees. The knee is stable within that range-of-motion to AP and ML stress. The alignment of the knee is 5 degrees varus. Muscle strength is normal. Pedal pulses are palpable. Hip examination was negative.\par  [de-identified] : Long standing knee, AP knee, lateral knee, and patellar views of the right knee were ordered and taken in the office and demonstrate degenerative joint disease of the knee with joint space narrowing, osteophyte formation, and subchondral sclerosis.\par \par The patient brings with her a MRI of the right knee. I reviewed the MRI imaging with the patient which demonstrates right knee OA and degenerative meniscal tears.

## 2023-07-20 ENCOUNTER — RX RENEWAL (OUTPATIENT)
Age: 68
End: 2023-07-20

## 2023-07-27 NOTE — PRE-ANESTHESIA EVALUATION ADULT - NSPROPOSEDPROCEDFT_GEN_ALL_CORE
sling cystoscopy laparoscopic robotic assisted sacral colpopexy laparoscopic robotic assisted supracervical hysterectomy negative

## 2023-08-04 ENCOUNTER — APPOINTMENT (OUTPATIENT)
Dept: ORTHOPEDIC SURGERY | Facility: CLINIC | Age: 68
End: 2023-08-04
Payer: MEDICARE

## 2023-08-04 VITALS
HEIGHT: 63 IN | HEART RATE: 73 BPM | SYSTOLIC BLOOD PRESSURE: 181 MMHG | WEIGHT: 170 LBS | DIASTOLIC BLOOD PRESSURE: 80 MMHG | BODY MASS INDEX: 30.12 KG/M2

## 2023-08-04 DIAGNOSIS — M25.562 PAIN IN RIGHT KNEE: ICD-10-CM

## 2023-08-04 DIAGNOSIS — G89.29 PAIN IN RIGHT KNEE: ICD-10-CM

## 2023-08-04 DIAGNOSIS — M25.561 PAIN IN RIGHT KNEE: ICD-10-CM

## 2023-08-04 PROCEDURE — 99214 OFFICE O/P EST MOD 30 MIN: CPT | Mod: 25

## 2023-08-04 PROCEDURE — 73564 X-RAY EXAM KNEE 4 OR MORE: CPT | Mod: 50

## 2023-08-04 PROCEDURE — 20610 DRAIN/INJ JOINT/BURSA W/O US: CPT | Mod: RT

## 2023-08-04 NOTE — PHYSICAL EXAM
[de-identified] : Well developed, well nourished in no apparent distress, awake, alert and orientated to person, place and time. with appropriate mood and affect.  Respirations are even and unlabored. Gait evaluation does reveal a limp. There is no inguinal adenopathy.  The legs are well-perfused, without skin lesions, shows a grossly normal motor and sensory examination.  Knee motion does cause some pain. ROM of both knees are 0-120 degrees.  The knees are stable within that range-of-motion to AP and ML stress.  Muscle strength is normal. Pedal pulses are palpable.  [de-identified] : Long standing  knee, AP knee, lateral knee, tunnel and patellar views of the bilat knee were ordered and taken in the office and demonstrate degenerative joint disease of the knee with joint space narrowing, osteophyte formation, and subchondral sclerosis.

## 2023-08-04 NOTE — HISTORY OF PRESENT ILLNESS
[de-identified] : This is a very nice  67 year old  female experiencing  pain in the both knees, but the left knee is better. Her right knee pain which is mild in intensity and has been going on for at least 3 months now. The pain somewhat limits activities of daily living. Walking tolerance is slightly reduced. The patient denies any radiation of the pain to the feet and it is not associated with numbness, tingling, or weakness.

## 2023-08-04 NOTE — DISCUSSION/SUMMARY
[de-identified] : The patient has bilat knee mild osteoarthritis. They are not an appropriate candidate for surgical intervention at this time. An extensive discussion was conducted on the natural history of the disease and the variety of surgical and non-surgical options available to the patient including, but not limited to non-steroidal anti-inflammatory medications, steroid injections, physical therapy, maintenance of ideal body weight, and reduction of activity. I recommended a prescription of Mobic but the patient would prefer to use OTC NSAIDs at this time.  Performed right knee intra-articular cortisone injections.  The patient will schedule an appointment as needed.   Informed consent for the right knee injection was obtained. All questions were answered. A time out was performed. The  knee was prepped and draped in sterile fashion. Using sterile technique, 80mg of Kenalog, 4cc of 1% lidocaine, 4cc of 0.25% marcaine using a 21-gauge needle. A sterile dressing was applied. Post injection instructions were reviewed. The patient tolerated the procedure well.

## 2023-08-17 ENCOUNTER — RX RENEWAL (OUTPATIENT)
Age: 68
End: 2023-08-17

## 2023-08-17 RX ORDER — CELECOXIB 100 MG/1
100 CAPSULE ORAL
Qty: 60 | Refills: 0 | Status: ACTIVE | COMMUNITY
Start: 2023-03-06 | End: 1900-01-01

## 2023-11-27 ENCOUNTER — OUTPATIENT (OUTPATIENT)
Dept: OUTPATIENT SERVICES | Facility: HOSPITAL | Age: 68
LOS: 1 days | End: 2023-11-27
Payer: COMMERCIAL

## 2023-11-27 ENCOUNTER — APPOINTMENT (OUTPATIENT)
Dept: CT IMAGING | Facility: CLINIC | Age: 68
End: 2023-11-27
Payer: MEDICARE

## 2023-11-27 DIAGNOSIS — Z98.890 OTHER SPECIFIED POSTPROCEDURAL STATES: Chronic | ICD-10-CM

## 2023-11-27 DIAGNOSIS — R22.2 LOCALIZED SWELLING, MASS AND LUMP, TRUNK: ICD-10-CM

## 2023-11-27 DIAGNOSIS — Z00.8 ENCOUNTER FOR OTHER GENERAL EXAMINATION: ICD-10-CM

## 2023-11-27 PROCEDURE — 74160 CT ABDOMEN W/CONTRAST: CPT | Mod: 26

## 2023-11-27 PROCEDURE — 74160 CT ABDOMEN W/CONTRAST: CPT

## 2024-04-28 ENCOUNTER — NON-APPOINTMENT (OUTPATIENT)
Age: 69
End: 2024-04-28

## 2024-05-01 ENCOUNTER — APPOINTMENT (OUTPATIENT)
Dept: ORTHOPEDIC SURGERY | Facility: CLINIC | Age: 69
End: 2024-05-01
Payer: MEDICARE

## 2024-05-01 VITALS — WEIGHT: 172 LBS | HEIGHT: 63 IN | BODY MASS INDEX: 30.48 KG/M2

## 2024-05-01 DIAGNOSIS — M17.11 UNILATERAL PRIMARY OSTEOARTHRITIS, RIGHT KNEE: ICD-10-CM

## 2024-05-01 DIAGNOSIS — Z96.651 PRESENCE OF RIGHT ARTIFICIAL KNEE JOINT: ICD-10-CM

## 2024-05-01 PROCEDURE — 99214 OFFICE O/P EST MOD 30 MIN: CPT | Mod: 25

## 2024-05-01 PROCEDURE — 73564 X-RAY EXAM KNEE 4 OR MORE: CPT | Mod: 50

## 2024-05-01 PROCEDURE — 20610 DRAIN/INJ JOINT/BURSA W/O US: CPT | Mod: RT

## 2024-05-01 RX ORDER — MELOXICAM 15 MG/1
15 TABLET ORAL
Qty: 30 | Refills: 2 | Status: ACTIVE | COMMUNITY
Start: 2024-05-01 | End: 1900-01-01

## 2024-05-01 RX ORDER — HYALURONATE SODIUM 20 MG/2 ML
20 SYRINGE (ML) INTRAARTICULAR
Qty: 1 | Refills: 0 | Status: ACTIVE | COMMUNITY
Start: 2024-05-01

## 2024-05-01 NOTE — HISTORY OF PRESENT ILLNESS
[de-identified] : This is a very nice 68 year old  female experiencing  pain in the right knee, which is severe and mild in the left knee. She has right knee osteoarthritis. Pain more than 1 year. The pain somewhat limits activities of daily living. Walking tolerance is slightly reduced. Prior cortisone injection helped. The patient denies any radiation of the pain to the feet and it is not associated with numbness, tingling, or weakness.

## 2024-05-01 NOTE — DISCUSSION/SUMMARY
[de-identified] : The patient has right knee mild osteoarthritis and mild left knee pain. They are not an appropriate candidate for surgical intervention at this time. An extensive discussion was conducted on the natural history of the disease and the variety of surgical and non-surgical options available to the patient including, but not limited to non-steroidal anti-inflammatory medications, steroid injections, physical therapy, maintenance of ideal body weight, and reduction of activity. Mobic prescribed.  Today I performed right knee intra-articular cortisone injections. I will apply to insurance for prior authorization for a HA series for the right knee. The patient will schedule an appointment as needed.   Informed consent for the right knee injection was obtained. All risks, benefits and alternatives were discussed. These included but were not limited to bleeding, infection, and allergic reaction.  All questions were answered. The right knee was prepped and draped in sterile fashion. Using sterile technique, the right knee was injected with 80mg of Kenalog, 4cc of 1% lidocaine, 4cc of 0.25% marcaine using a 21-gauge needle. A sterile dressing was applied. Post injection instructions were reviewed. The patient tolerated the procedure well.

## 2024-05-01 NOTE — PHYSICAL EXAM
[de-identified] : Well developed, well nourished in no apparent distress, awake, alert and orientated to person, place and time. with appropriate mood and affect.  Respirations are even and unlabored. Gait evaluation does reveal a limp. There is no inguinal adenopathy.  The legs are well-perfused, without skin lesions, shows a grossly normal motor and sensory examination.  Knee motion does cause some pain. ROM of both knees are 0-120 degrees.  The knees are stable within that range-of-motion to AP and ML stress.  Muscle strength is normal. Pedal pulses are palpable.  [de-identified] : Long standing  knee, AP knee, lateral knee, tunnel and patellar views of the right knee were ordered and taken in the office and demonstrate mild degenerative joint disease of the knee with joint space narrowing, osteophyte formation, and subchondral sclerosis.   Long standing  knee, AP knee, lateral knee, tunnel and patellar views of the left knee were ordered and taken in the office and demonstrate no evidence of degenerative joint disease of the knee with maintained joint space, no fracture or dislocation.

## 2024-05-04 ENCOUNTER — TRANSCRIPTION ENCOUNTER (OUTPATIENT)
Age: 69
End: 2024-05-04

## 2024-05-16 ENCOUNTER — TRANSCRIPTION ENCOUNTER (OUTPATIENT)
Age: 69
End: 2024-05-16

## 2024-06-13 NOTE — ASU PATIENT PROFILE, ADULT - NS PRO PT RIGHT SUPPORT PERSON
In an effort to ensure that our patients [employer], a Team Member has reviewed your chart and identified an opportunity to provide the best care possible. An attempt was made to discuss or schedule due or overdue Preventive or Chronic Condition care.    The Outcome was Contact was not made, letter/portal message sent.  We are attempting to schedule a follow up office visit. If you have any questions or need help with scheduling, contact your primary care provider.. Care Gaps identified: Colorectal Cancer Screening.    Appointment needed: Primary Care Visit   Declines